# Patient Record
Sex: FEMALE | Race: WHITE
[De-identification: names, ages, dates, MRNs, and addresses within clinical notes are randomized per-mention and may not be internally consistent; named-entity substitution may affect disease eponyms.]

---

## 2019-01-14 ENCOUNTER — HOSPITAL ENCOUNTER (OUTPATIENT)
Dept: HOSPITAL 10 - SDS | Age: 54
Discharge: HOME | End: 2019-01-14
Attending: ORTHOPAEDIC SURGERY
Payer: COMMERCIAL

## 2019-01-14 ENCOUNTER — HOSPITAL ENCOUNTER (OUTPATIENT)
Dept: HOSPITAL 91 - SDS | Age: 54
Discharge: HOME | End: 2019-01-14
Payer: COMMERCIAL

## 2019-01-14 VITALS — SYSTOLIC BLOOD PRESSURE: 105 MMHG | DIASTOLIC BLOOD PRESSURE: 69 MMHG | HEART RATE: 68 BPM | RESPIRATION RATE: 19 BRPM

## 2019-01-14 VITALS — DIASTOLIC BLOOD PRESSURE: 67 MMHG | RESPIRATION RATE: 14 BRPM | HEART RATE: 64 BPM | SYSTOLIC BLOOD PRESSURE: 106 MMHG

## 2019-01-14 VITALS — HEART RATE: 66 BPM | DIASTOLIC BLOOD PRESSURE: 70 MMHG | SYSTOLIC BLOOD PRESSURE: 115 MMHG | RESPIRATION RATE: 24 BRPM

## 2019-01-14 VITALS — HEART RATE: 68 BPM | DIASTOLIC BLOOD PRESSURE: 71 MMHG | RESPIRATION RATE: 16 BRPM | SYSTOLIC BLOOD PRESSURE: 110 MMHG

## 2019-01-14 VITALS — DIASTOLIC BLOOD PRESSURE: 67 MMHG | RESPIRATION RATE: 16 BRPM | HEART RATE: 82 BPM | SYSTOLIC BLOOD PRESSURE: 124 MMHG

## 2019-01-14 VITALS — DIASTOLIC BLOOD PRESSURE: 69 MMHG | SYSTOLIC BLOOD PRESSURE: 109 MMHG | RESPIRATION RATE: 20 BRPM | HEART RATE: 68 BPM

## 2019-01-14 VITALS — RESPIRATION RATE: 18 BRPM | DIASTOLIC BLOOD PRESSURE: 70 MMHG | SYSTOLIC BLOOD PRESSURE: 109 MMHG | HEART RATE: 72 BPM

## 2019-01-14 VITALS — RESPIRATION RATE: 16 BRPM | HEART RATE: 64 BPM | SYSTOLIC BLOOD PRESSURE: 103 MMHG | DIASTOLIC BLOOD PRESSURE: 68 MMHG

## 2019-01-14 VITALS — RESPIRATION RATE: 19 BRPM | HEART RATE: 70 BPM | SYSTOLIC BLOOD PRESSURE: 113 MMHG | DIASTOLIC BLOOD PRESSURE: 69 MMHG

## 2019-01-14 VITALS — HEART RATE: 80 BPM | SYSTOLIC BLOOD PRESSURE: 104 MMHG | DIASTOLIC BLOOD PRESSURE: 65 MMHG | RESPIRATION RATE: 18 BRPM

## 2019-01-14 VITALS — DIASTOLIC BLOOD PRESSURE: 69 MMHG | RESPIRATION RATE: 23 BRPM | SYSTOLIC BLOOD PRESSURE: 120 MMHG | HEART RATE: 85 BPM

## 2019-01-14 VITALS — RESPIRATION RATE: 24 BRPM | HEART RATE: 70 BPM | SYSTOLIC BLOOD PRESSURE: 113 MMHG | DIASTOLIC BLOOD PRESSURE: 74 MMHG

## 2019-01-14 VITALS — DIASTOLIC BLOOD PRESSURE: 67 MMHG | RESPIRATION RATE: 14 BRPM | SYSTOLIC BLOOD PRESSURE: 104 MMHG | HEART RATE: 62 BPM

## 2019-01-14 VITALS
HEIGHT: 69 IN | BODY MASS INDEX: 24.85 KG/M2 | WEIGHT: 167.77 LBS | WEIGHT: 167.77 LBS | HEIGHT: 69 IN | BODY MASS INDEX: 24.85 KG/M2

## 2019-01-14 DIAGNOSIS — E78.5: ICD-10-CM

## 2019-01-14 DIAGNOSIS — M19.072: Primary | ICD-10-CM

## 2019-01-14 DIAGNOSIS — M65.872: ICD-10-CM

## 2019-01-14 PROCEDURE — 28750 FUSION OF BIG TOE JOINT: CPT

## 2019-01-14 PROCEDURE — 84703 CHORIONIC GONADOTROPIN ASSAY: CPT

## 2019-01-14 PROCEDURE — 73660 X-RAY EXAM OF TOE(S): CPT

## 2019-01-14 RX ADMIN — ONDANSETRON HYDROCHLORIDE 1 MG: 2 INJECTION, SOLUTION INTRAMUSCULAR; INTRAVENOUS at 15:05

## 2019-01-14 RX ADMIN — KETOROLAC TROMETHAMINE 1 ML: 30 INJECTION, SOLUTION INTRAMUSCULAR at 11:56

## 2019-01-14 RX ADMIN — POVIDONE-IODINE 1 APPLIC: 100 OINTMENT TOPICAL at 11:56

## 2019-01-14 RX ADMIN — CEFAZOLIN 1 MLS/HR: 1 INJECTION, POWDER, FOR SOLUTION INTRAMUSCULAR; INTRAVENOUS at 15:05

## 2019-01-14 NOTE — PREAC
Date/Time of Note


Date/Time of Note


DATE: 1/14/19 


TIME: 11:42





Anesthesia Eval and Record


Evaluation


Time Pre-Procedure Interview


DATE: 1/14/19 


TIME: 11:42


Age


53


Sex


female


NPO:  8 hrs


Preoperative diagnosis


Degenerative joint disease, left great toe


Planned procedure


Cheilectomy





Past Medical History


Past Medical History:  Includes


Cardio:  Dyslipidemia





Surgery & Anesthesia Issues


No known issue





Meds


Anticoagulation:  No


Beta Blocker within 24 hr:  No


Reason Beta Blocker not given:  Pt. not on B-Blocker


Reported Medications


Atorvastatin (Atorvastatin) 10 Mg Tablet, 2.5 MG PO twice a week


   1/11/19


Meds reviewed:  Yes





Allergies


Coded Allergies:  


     Latex, Natural Rubber (Verified  Allergy, Unknown, 1/14/19)


     adhesive tape (Verified  Allergy, Unknown, 1/14/19)


Allergies Reviewed:  Yes





Labs/Studies


Labs Reviewed:  Reviewed by anesthesiologist


Pregnancy test:  Negative





Pre-procedure Exam


Last vitals





Vital Signs


  Date      Temp  Pulse  Resp  B/P (MAP)   Pulse Ox  O2          O2 Flow    FiO2


Time                                                 Delivery    Rate


   1/14/19  98.9     82    16      124/67        98  Room Air


     09:32                           (86)





Airway:  Adequate mouth opening


Mallampati:  Mallampati I


Teeth:  Normal


Lung:  Normal


Heart:  Normal





ASA Physical Status


ASA physical status:  2


Emergency:  None





Planned Anesthetic


General/MAC:  ETT





Planned Pain Management


Single shot nerve block, Parenteral pain med





Pre-operative Attestations


Prior to commencing anesthesia and surgery, the patient was re-evaluated, there 


was verification of:


*The patient's identity


*The results of appropriate recent lab work and preoperative vital signs


*The above evaluation not changing prior to induction


*Anesthetic plan, risk benefits, alternative and complications discussed with 


patient/family; questions answered; patient/family understands, accepts and 


wishes to proceed.











LAURA HELMS MD               Jan 14, 2019 11:43

## 2019-01-14 NOTE — OPPN
Date/Time of Note


Date/Time of Note


DATE: 1/14/19 


TIME: 14:36





Operative Report


Preoperative Diagnosis


Left great toe degenerative joint disease


Postoperative Diagnosis


same


Operation/Procedure Performed


Left MTP chilectomy and insertion of Cartiva graft


Surgeon


see signature line


First assist


DO Mauri


Anesthesia:  general, MAC


Estimated blood loss:  minimal


Transfusion Required


   none


Specimen


none


Grafts/Implants


none


Complications


none











HEIDY HENDRICKS MD            Jan 14, 2019 14:38

## 2019-01-14 NOTE — PAC
Date/Time of Note


Date/Time of Note


DATE: 1/14/19 


TIME: 15:40





Post-Anesthesia Notes


Post-Anesthesia Note


Last documented vital signs





Vital Signs


  Date      Temp  Pulse  Resp  B/P (MAP)   Pulse Ox  O2          O2 Flow    FiO2


Time                                                 Delivery    Rate


   1/14/19  98.9     82    16      124/67        98  Room Air


     09:32                           (86)





Activity:  WNL


Respiratory function:  WNL


Cardiovascular function:  WNL


Mental status:  Baseline


Pain reasonably controlled:  Yes


Hydration appropriate:  Yes


Nausea/Vomiting absent:  Yes











LAURA HELMS MD               Jan 14, 2019 15:41

## 2019-01-14 NOTE — OPR
DATE OF OPERATION:  01/14/2019

 

 

PREOPERATIVE DIAGNOSES:

1.  Degenerative joint disease of left great toe metatarsophalangeal joint.

2.  Large osteochondral loose body of left great toe metatarsophalangeal joint.

 

POSTOPERATIVE DIAGNOSES:

1.  Degenerative joint disease of left great toe metatarsophalangeal joint.

2.  Large osteochondral loose body of left great toe metatarsophalangeal joint.

3.  Synovitis.

 

OPERATIONS PERFORMED:

1.  Cheilectomy, left great toe metatarsophalangeal joint.

2.  Synovectomy removal of loose body.

3.  Insertion of Cartiva cartilage implant in the left great toe 

metatarsophalangeal joint.

 

SURGEON:  Heidy Lopez MD

 

FIRST ASSISTANT:  Wolf Dotson 

 

ANESTHESIA:  General with popliteal block.

 

TOURNIQUET TIME:  70 minutes.

 

DESCRIPTION OF PROCEDURE:  The patient was taken to operating room and placed in

supine position.  Satisfactory general anesthesia was administered after 

popliteal block was given.  A 2 grams Ancef were given intravenously.  The left 

foot was prepped and draped in usual manner.  Dorsomedial incision was made.  

Dissection was carried down through subcutaneous tissue.  Neurovascular bundles 

were elevated medially and laterally.  An incision was made dorsomedially to the

capsule.  The capsule was peeled off the proximal phalanx and distal aspect of 

the metatarsal head and neck.  The entire joint was exposed.  There was a large 

osteochondral loose body blocking the dorsal aspect of the joint.  This was 

excised sharply.  There was grade IV chondromalacia on the dorsal central aspect

of the metatarsal head, thinning of the proximal phalanx, osteophytes rimming 

the proximal phalanx as well as rimming the metatarsal head.  Osteophytes 

removed from the proximal phalanx circumferentially.  Power rasp was used to 

smooth and contour the edges.  The osteophytes were removed with a rongeur also 

from the metatarsal head and neck region.  A power rasp was used to smooth and 

contour the edges.  Wounds were irrigated with antibiotic solution.  Synovectomy

performed of the joint.  We identified the target implant position using the 

concave end of the hollow tube.  We had good bone more than 2 mm 

circumferentially.  Using the guide pin through the hollow tube, the guide pin 

was placed in slight dorsally on the joint in the area of the maximum loss of 

cartilage.  It was placed perpendicular to the metatarsal head.  It was checked 

in AP and lateral fluoroscopic views and appeared to be in appropriate position.

 The cannulated drill bit was used to drill the hole; however, we left it so 

that the implant would be 2.5 mm proud particularly dorsally and at least a 0.5 

to 2 mm laterally.  We carefully checked repeatedly and measured with a ruler.  

Once the appropriate depth, the wounds were irrigated with antibiotic solution. 

All bony debris was removed.  The Cartiva implant was taken from the sterile 

packaging and the introducer was moistened with sterile saline.  The implant was

put in the introducer twice and then the introducer tube with the lip was placed

in the implant site at the exact position we wanted.  We carefully pressed down 

on  while maintaining the distal end of the implant and put the implant 

in the metatarsal head defect.  When we were done, the implant was appropriately

proud 2.5 mm dorsally and 0.5 mm plantarly.  The MTP joint had good motion and 

appeared to be stable and well aligned on the AP and lateral radiographs.  Final

radiographs were done which showed the implant in good position.  Remaining 

osteophytes and debris was removed.  Wounds were irrigated with antibiotic 

solution.  There was no major restriction or limitation of joint motion except 

what had been there before.  The capsule was repaired with a running 3-0 undyed 

Vicryl loosely.  The tourniquet was released.  Bleeders were coagulated.  Wounds

were irrigated with antibiotic solution.  Skin was closed with 5-0 black nylon. 

Saphenous nerve block was done with 0.5% ropivacaine.  Compression dressing was 

applied as well as a postop shoe and the patient was brought to recovery room in

stable condition.  Interprocedure sponge and needle count was correct.  The 

patient tolerated procedure well.

 

 

Dictated By: HEIDY PAL/TAVO

DD:    01/14/2019 16:40:40

DT:    01/14/2019 17:47:25

Conf#: [id number]

DID#:  [id number]

 

RENUKA